# Patient Record
(demographics unavailable — no encounter records)

---

## 2025-02-05 NOTE — END OF VISIT
[FreeTextEntry3] : All medical record entries made by the Scribe were at my, Herber Rose MD, direction and personally dictated by me on 02/05/2025. I have reviewed the chart and agree that the record accurately reflects my personal performance of the history, physical exam, assessment and plan. I have also personally directed, reviewed, and agreed with the chart. [Time Spent: ___ minutes] : I have spent [unfilled] minutes of time on the encounter which excludes teaching and separately reported services.

## 2025-02-05 NOTE — ADDENDUM
[FreeTextEntry1] : I, Alexander Irene, documented this note as a scribe on behalf of Herber Rose MD on 02/05/2025.

## 2025-02-05 NOTE — DISCUSSION/SUMMARY
[de-identified] : We discussed further treatment options. He will continue with his cervical collar. He will follow up in 2 months with flexion/extension films.
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
